# Patient Record
Sex: MALE | Race: WHITE | Employment: OTHER | URBAN - METROPOLITAN AREA
[De-identification: names, ages, dates, MRNs, and addresses within clinical notes are randomized per-mention and may not be internally consistent; named-entity substitution may affect disease eponyms.]

---

## 2021-07-12 DIAGNOSIS — A69.20 LYME DISEASE: ICD-10-CM

## 2021-07-15 LAB
LYME AB IGM BY WB:: POSITIVE
LYME, EIA: 1.79 LIV (ref 0–1.2)

## 2022-12-13 NOTE — PROGRESS NOTES
[facility] PRE-ADMISSION TESTING INSTRUCTIONS    The Preadmission Testing patient is instructed accordingly using the following criteria (check applicable):    ARRIVAL INSTRUCTIONS:  [x] Parking the day of Surgery is located in the Main Entrance lot. Upon entering the door, make an immediate right to the surgery reception desk    [x] Bring photo ID and insurance card    [] Bring in a copy of Living will or Durable Power of  papers. [x] Please be sure to arrange for responsible adult to provide transportation to and from the hospital    [x] Please arrange for responsible adult to be with you for the 24 hour period post procedure due to having anesthesia    [x] If you awake am of surgery not feeling well or have temperature >100 please call [phone]    GENERAL INSTRUCTIONS:    [x] Nothing by mouth after midnight, including gum, candy, mints or water    [x] You may brush your teeth, but do not swallow any water    [x] Take medications as instructed with 1-2 oz of water    [] Stop herbal supplements and vitamins 5 days prior to procedure    [x] Follow preop dosing of blood thinners per physician instructions    [] Take 1/2 dose of evening insulin, but no insulin after midnight    [] No oral diabetic medications after midnight    [] If diabetic and have low blood sugar or feel symptomatic, take 1-2oz apple juice only    [] Bring inhalers day of surgery    [] Bring C-PAP/ Bi-Pap day of surgery    [] Bring urine specimen day of surgery    [x] Shower or bath with soap, lather and rinse well, AM of Surgery, no lotion, powders or creams to surgical site    [] Follow bowel prep as instructed per surgeon    [x] No tobacco products within 24 hours of surgery     [x] No alcohol or illegal drug use within 24 hours of surgery.     [x] Jewelry, body piercing's, eyeglasses, contact lenses and dentures are not permitted into surgery (bring cases)      [x] Please do not wear any nail polish, make up or hair products on the day of surgery    [x] You can expect a call the business day prior to procedure to notify you if your arrival time changes    [x] If you receive a survey after surgery we would greatly appreciate your comments    [] Parent/guardian of a minor must accompany their child and remain on the premises  the entire time they are under our care     [] Pediatric patients may bring favorite toy, blanket or comfort item with them    [] A caregiver or family member must remain with the patient during their stay if they are mentally handicapped, have dementia, disoriented or unable to use a call light or would be a safety concern if left unattended    [x] Please notify surgeon if you develop any illness between now and time of surgery (cold, cough, sore throat, fever, nausea, vomiting) or any signs of infections  including skin, wounds, and dental.    [x]  The Outpatient Pharmacy is available to fill your prescription here on your day of surgery, ask your preop nurse for details    [] Other instructions    EDUCATIONAL MATERIALS PROVIDED:    [] PAT Preoperative Education Packet/Booklet     [] Medication List    [] Transfusion bracelet applied with instructions    [] Shower with soap, lather and rinse well, and use CHG wipes provided the evening before surgery as instructed    [] Incentive spirometer with instructions

## 2022-12-14 ENCOUNTER — ANESTHESIA (OUTPATIENT)
Dept: OPERATING ROOM | Age: 72
End: 2022-12-14
Payer: MEDICARE

## 2022-12-14 ENCOUNTER — ANESTHESIA EVENT (OUTPATIENT)
Dept: OPERATING ROOM | Age: 72
End: 2022-12-14
Payer: MEDICARE

## 2022-12-14 ENCOUNTER — HOSPITAL ENCOUNTER (OUTPATIENT)
Age: 72
Setting detail: OUTPATIENT SURGERY
Discharge: HOME | End: 2022-12-14
Attending: ORTHOPAEDIC SURGERY | Admitting: ORTHOPAEDIC SURGERY
Payer: MEDICARE

## 2022-12-14 ENCOUNTER — APPOINTMENT (OUTPATIENT)
Dept: GENERAL RADIOLOGY | Age: 72
End: 2022-12-14
Attending: ORTHOPAEDIC SURGERY
Payer: MEDICARE

## 2022-12-14 VITALS
BODY MASS INDEX: 27.28 KG/M2 | HEART RATE: 58 BPM | SYSTOLIC BLOOD PRESSURE: 144 MMHG | RESPIRATION RATE: 17 BRPM | OXYGEN SATURATION: 97 % | HEIGHT: 68 IN | DIASTOLIC BLOOD PRESSURE: 68 MMHG | WEIGHT: 180 LBS | TEMPERATURE: 96.8 F

## 2022-12-14 DIAGNOSIS — G89.18 POST-OP PAIN: Primary | ICD-10-CM

## 2022-12-14 DIAGNOSIS — S61.210A LACERATION OF RIGHT INDEX FINGER WITHOUT FOREIGN BODY WITHOUT DAMAGE TO NAIL, INITIAL ENCOUNTER: ICD-10-CM

## 2022-12-14 PROCEDURE — 87077 CULTURE AEROBIC IDENTIFY: CPT

## 2022-12-14 PROCEDURE — 3700000000 HC ANESTHESIA ATTENDED CARE: Performed by: ORTHOPAEDIC SURGERY

## 2022-12-14 PROCEDURE — 6360000002 HC RX W HCPCS: Performed by: NURSE ANESTHETIST, CERTIFIED REGISTERED

## 2022-12-14 PROCEDURE — 87186 SC STD MICRODIL/AGAR DIL: CPT

## 2022-12-14 PROCEDURE — 87116 MYCOBACTERIA CULTURE: CPT

## 2022-12-14 PROCEDURE — 87205 SMEAR GRAM STAIN: CPT

## 2022-12-14 PROCEDURE — 11012 DEB SKIN BONE AT FX SITE: CPT | Performed by: ORTHOPAEDIC SURGERY

## 2022-12-14 PROCEDURE — 11044 DBRDMT BONE 1ST 20 SQ CM/<: CPT | Performed by: ORTHOPAEDIC SURGERY

## 2022-12-14 PROCEDURE — 11760 REPAIR OF NAIL BED: CPT | Performed by: ORTHOPAEDIC SURGERY

## 2022-12-14 PROCEDURE — 3209999900 FLUORO FOR SURGICAL PROCEDURES

## 2022-12-14 PROCEDURE — 87206 SMEAR FLUORESCENT/ACID STAI: CPT

## 2022-12-14 PROCEDURE — 2580000003 HC RX 258: Performed by: NURSE ANESTHETIST, CERTIFIED REGISTERED

## 2022-12-14 PROCEDURE — 6360000002 HC RX W HCPCS: Performed by: PHYSICIAN ASSISTANT

## 2022-12-14 PROCEDURE — 87102 FUNGUS ISOLATION CULTURE: CPT

## 2022-12-14 PROCEDURE — 7100000011 HC PHASE II RECOVERY - ADDTL 15 MIN: Performed by: ORTHOPAEDIC SURGERY

## 2022-12-14 PROCEDURE — 87176 TISSUE HOMOGENIZATION CULTR: CPT

## 2022-12-14 PROCEDURE — 26756 PIN FINGER FRACTURE EACH: CPT | Performed by: ORTHOPAEDIC SURGERY

## 2022-12-14 PROCEDURE — 2709999900 HC NON-CHARGEABLE SUPPLY: Performed by: ORTHOPAEDIC SURGERY

## 2022-12-14 PROCEDURE — 3600000012 HC SURGERY LEVEL 2 ADDTL 15MIN: Performed by: ORTHOPAEDIC SURGERY

## 2022-12-14 PROCEDURE — 3700000001 HC ADD 15 MINUTES (ANESTHESIA): Performed by: ORTHOPAEDIC SURGERY

## 2022-12-14 PROCEDURE — 87070 CULTURE OTHR SPECIMN AEROBIC: CPT

## 2022-12-14 PROCEDURE — 87075 CULTR BACTERIA EXCEPT BLOOD: CPT

## 2022-12-14 PROCEDURE — 2500000003 HC RX 250 WO HCPCS: Performed by: ORTHOPAEDIC SURGERY

## 2022-12-14 PROCEDURE — 7100000010 HC PHASE II RECOVERY - FIRST 15 MIN: Performed by: ORTHOPAEDIC SURGERY

## 2022-12-14 PROCEDURE — 2580000003 HC RX 258: Performed by: PHYSICIAN ASSISTANT

## 2022-12-14 PROCEDURE — 3600000002 HC SURGERY LEVEL 2 BASE: Performed by: ORTHOPAEDIC SURGERY

## 2022-12-14 PROCEDURE — 87106 FUNGI IDENTIFICATION YEAST: CPT

## 2022-12-14 DEVICE — K WIRE FIX L6IN DIA0.045IN 1600645] MICROAIRE SURGICAL INSTRUMENTS INC]: Type: IMPLANTABLE DEVICE | Site: HAND | Status: FUNCTIONAL

## 2022-12-14 RX ORDER — PROPOFOL 10 MG/ML
INJECTION, EMULSION INTRAVENOUS CONTINUOUS PRN
Status: DISCONTINUED | OUTPATIENT
Start: 2022-12-14 | End: 2022-12-14 | Stop reason: SDUPTHER

## 2022-12-14 RX ORDER — SODIUM CHLORIDE 0.9 % (FLUSH) 0.9 %
5-40 SYRINGE (ML) INJECTION EVERY 12 HOURS SCHEDULED
Status: DISCONTINUED | OUTPATIENT
Start: 2022-12-14 | End: 2022-12-14 | Stop reason: HOSPADM

## 2022-12-14 RX ORDER — MEPERIDINE HYDROCHLORIDE 25 MG/ML
12.5 INJECTION INTRAMUSCULAR; INTRAVENOUS; SUBCUTANEOUS EVERY 10 MIN PRN
Status: DISCONTINUED | OUTPATIENT
Start: 2022-12-14 | End: 2022-12-14 | Stop reason: HOSPADM

## 2022-12-14 RX ORDER — FENTANYL CITRATE 50 UG/ML
25 INJECTION, SOLUTION INTRAMUSCULAR; INTRAVENOUS EVERY 5 MIN PRN
Status: DISCONTINUED | OUTPATIENT
Start: 2022-12-14 | End: 2022-12-14 | Stop reason: HOSPADM

## 2022-12-14 RX ORDER — CEPHALEXIN 500 MG/1
500 CAPSULE ORAL 3 TIMES DAILY
Qty: 21 CAPSULE | Refills: 0 | Status: SHIPPED | OUTPATIENT
Start: 2022-12-14 | End: 2022-12-21

## 2022-12-14 RX ORDER — SODIUM CHLORIDE 0.9 % (FLUSH) 0.9 %
5-40 SYRINGE (ML) INJECTION PRN
Status: DISCONTINUED | OUTPATIENT
Start: 2022-12-14 | End: 2022-12-14 | Stop reason: HOSPADM

## 2022-12-14 RX ORDER — SODIUM CHLORIDE 9 MG/ML
INJECTION, SOLUTION INTRAVENOUS CONTINUOUS PRN
Status: DISCONTINUED | OUTPATIENT
Start: 2022-12-14 | End: 2022-12-14 | Stop reason: SDUPTHER

## 2022-12-14 RX ORDER — SODIUM CHLORIDE 9 MG/ML
INJECTION, SOLUTION INTRAVENOUS CONTINUOUS
Status: DISCONTINUED | OUTPATIENT
Start: 2022-12-14 | End: 2022-12-14 | Stop reason: HOSPADM

## 2022-12-14 RX ORDER — PROCHLORPERAZINE EDISYLATE 5 MG/ML
5 INJECTION INTRAMUSCULAR; INTRAVENOUS
Status: DISCONTINUED | OUTPATIENT
Start: 2022-12-14 | End: 2022-12-14 | Stop reason: HOSPADM

## 2022-12-14 RX ORDER — FENTANYL CITRATE 50 UG/ML
INJECTION, SOLUTION INTRAMUSCULAR; INTRAVENOUS PRN
Status: DISCONTINUED | OUTPATIENT
Start: 2022-12-14 | End: 2022-12-14 | Stop reason: SDUPTHER

## 2022-12-14 RX ORDER — SODIUM CHLORIDE 9 MG/ML
25 INJECTION, SOLUTION INTRAVENOUS PRN
Status: DISCONTINUED | OUTPATIENT
Start: 2022-12-14 | End: 2022-12-14 | Stop reason: HOSPADM

## 2022-12-14 RX ORDER — OXYCODONE HYDROCHLORIDE AND ACETAMINOPHEN 5; 325 MG/1; MG/1
1 TABLET ORAL EVERY 6 HOURS PRN
Qty: 20 TABLET | Refills: 0 | Status: SHIPPED | OUTPATIENT
Start: 2022-12-14 | End: 2022-12-19

## 2022-12-14 RX ORDER — DIPHENHYDRAMINE HYDROCHLORIDE 50 MG/ML
12.5 INJECTION INTRAMUSCULAR; INTRAVENOUS
Status: DISCONTINUED | OUTPATIENT
Start: 2022-12-14 | End: 2022-12-14 | Stop reason: HOSPADM

## 2022-12-14 RX ORDER — SODIUM CHLORIDE 9 MG/ML
INJECTION, SOLUTION INTRAVENOUS PRN
Status: DISCONTINUED | OUTPATIENT
Start: 2022-12-14 | End: 2022-12-14 | Stop reason: HOSPADM

## 2022-12-14 RX ORDER — LIDOCAINE HYDROCHLORIDE 10 MG/ML
INJECTION, SOLUTION EPIDURAL; INFILTRATION; INTRACAUDAL; PERINEURAL PRN
Status: DISCONTINUED | OUTPATIENT
Start: 2022-12-14 | End: 2022-12-14 | Stop reason: ALTCHOICE

## 2022-12-14 RX ADMIN — SODIUM CHLORIDE: 9 INJECTION, SOLUTION INTRAVENOUS at 10:18

## 2022-12-14 RX ADMIN — FENTANYL CITRATE 100 MCG: 50 INJECTION, SOLUTION INTRAMUSCULAR; INTRAVENOUS at 10:23

## 2022-12-14 RX ADMIN — WATER 2000 MG: 1 INJECTION INTRAMUSCULAR; INTRAVENOUS; SUBCUTANEOUS at 10:18

## 2022-12-14 RX ADMIN — PROPOFOL 100 MCG/KG/MIN: 10 INJECTION, EMULSION INTRAVENOUS at 10:23

## 2022-12-14 ASSESSMENT — LIFESTYLE VARIABLES: SMOKING_STATUS: 0

## 2022-12-14 ASSESSMENT — PAIN SCALES - GENERAL: PAINLEVEL_OUTOF10: 0

## 2022-12-14 NOTE — H&P
Department of Orthopedic Surgery  History and Physical        CHIEF COMPLAINT: Right index and middle finger lacerations     HISTORY OF PRESENT ILLNESS:                 The patient is a RHD 71 y.o. male who presents with right middle and index finger lacerations. Patient reports yesterday he was making a bed using a table saw when he sustained a laceration to his right index and middle finger. He went to [place] emergency department where he was evaluated. He was found to have open fractures to his right index and middle fingers. Sutures placed. He did have IV antibiotics in the emergency department. He has not had time to fill his oral antibiotics yet. He is retired. Past Medical History:    Past Medical History             Diagnosis Date    CAD (coronary artery disease)      Hyperlipidemia      Hypertension      Hypothyroid           Past Surgical History:    Past Surgical History             Procedure Laterality Date    CORONARY ANGIOPLASTY WITH STENT PLACEMENT   11/23/2015         Current Medications:   Current Hospital Medications   No current facility-administered medications for this visit. Allergies:  Patient has no known allergies. Social History:   TOBACCO:   reports that he has quit smoking. His smoking use included cigarettes. He has never used smokeless tobacco.  ETOH:   reports current alcohol use. DRUGS:   reports no history of drug use. ACTIVITIES OF DAILY LIVING:    OCCUPATION:    Family History:   Family History   No family history on file.         REVIEW OF SYSTEMS:  CONSTITUTIONAL:  negative  EYES:  negative  HEENT:  negative  RESPIRATORY:  negative  CARDIOVASCULAR:  HTN, Hyperlipidemia  GASTROINTESTINAL:  negative  GENITOURINARY:  negative  INTEGUMENT/BREAST:  negative  HEMATOLOGIC/LYMPHATIC:  negative  ALLERGIC/IMMUNOLOGIC:  negative  ENDOCRINE:  negative  MUSCULOSKELETAL:  right index and middle finger  NEUROLOGICAL:  negative  BEHAVIOR/PSYCH:  negative     PHYSICAL EXAM: VITALS:  Ht 5' 8\" (1.727 m)   Wt 180 lb (81.6 kg)   BMI 27.37 kg/m²   CONSTITUTIONAL:  awake, alert, cooperative, no apparent distress, and appears stated age  EYES:  Lids and lashes normal, pupils equal, round and reactive to light, extra ocular muscles intact, sclera clear, conjunctiva normal  ENT:  Normocephalic, without obvious abnormality, atraumatic, sinuses nontender on palpation, external ears without lesions, oral pharynx with moist mucus membranes, tonsils without erythema or exudates, gums normal and good dentition. NECK:  Supple, symmetrical, trachea midline, no adenopathy, thyroid symmetric, not enlarged and no tenderness, skin normal  LUNGS:  CTA  CARDIOVASCULAR:  2+ radial pulses, extremities warm and well perfused  ABDOMEN:  obese, NTTP  CHEST:  Atraumatic   GENITAL/URINARY:  deferred  NEUROLOGIC:  Awake, alert, oriented to name, place and time. Cranial nerves II-XII are grossly intact. Motor is 5 out of 5 bilaterally. Sensory is intact.  gait is normal.  MUSCULOSKELETAL:     Right upper extremity: index finger with laceration over the dorsal radial aspect over the DIP joint. Sutures in place. Mild duskiness to the flap. Bloody drainage present. No erythema or signs of infection. + FDP and FDS function to the index finger with radial and ulnar digital n intact to light touch. Middle finger with laceration over his DIP joint dorsally extending volarly. Deformity with extensor lag to the DIP joint. No erythema or signs of infection. + bloody drainage. FDP and FDS intact today. Radial and ulnar digital n intact to light touch proximally to the laceration. Over the pulp of the middle finger there is no sensation distal to his middle finger ulnar digital nerve. Brisk capillary refill. Otherwise NVI.          DATA:    CBC: No results found for: WBC, RBC, HGB, HCT, MCV, MCH, MCHC, RDW, PLT, MPV  PT/INR:  No results found for: PROTIME, INR     Radiology Review:  Xray: x-rays of the right hand were obtained today in the office and reviewed with the patient. 3 views: AP/lateral/oblique : demonstrate DIP joint arthritis to the right index and middle finger. Index finger with middle phalanx nondisplaced fracture. Middle finger with distal phalanx displaced fracture and intra-articular fracture of the middle phalanx. Impression: Right index and middle finger middle phalanx fracture, right middle finger distal phalanx fracture     IMPRESSION:  Open, right index finger middle phalanx fracture with laceration  Open, right middle finger distal phalanx and middle phalanx fracture with laceration  HTN  Hyperlipidemia     PLAN:  Discussed findings with the patient at today's visit. Discussed conservative and surgical management with the patient. Patient opted to proceed with surgical management. Recommended right index and middle finger excisional debridement and fracture fixation. Patient to start his oral antibiotics. Discussed his underlying arthritis and stiffness postoperatively. Until surgery the patient was provided a removable splint and is to start daily dressing changes. Patient would like to proceed. All questions answered. I explained the risks, benefits, alternatives and complications of surgery with the patient including but not limited to the risks of death, possible damage to nerves, vessels, or tendons, possible infection, possible nonunion, possible malunion, possible hardware failure, possible need for hardware removal, stiffness, as well as the possible need further surgery and unanticipated complications. The patient voiced understanding and all questions were answered. The patient elected to proceed with surgical intervention. I have seen and evaluated the patient and agree with the above assessment and plan on today's visit.  I have performed the key components of the history and physical examination with significant findings of tablesaw injury right index and middle fingers with associated fractures and soft tissue injuries. 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 Discussed complexity of the injury and both surgical and nonsurgical treatment options. Patient wishes to pursue surgical invention to minimize the risk of infection and long-term sequelae. Discussed complexity of the injury and possible need for secondary procedures. . I concur with the findings and plan as documented. History and Physical Update     Patient was seen and examined. Patient's history and physical was reviewed with the patient. There has been no significant interval changes.       Electronically signed by Lew Cook DO on 12/14/2022 at 8:31 AM

## 2022-12-14 NOTE — DISCHARGE INSTRUCTIONS
DISCHARGE INSTRUCTIONS TO HOME FOLLOWING SURGERY    ACTIVITY INSTRUCTIONS:    Elevate extremity to help reduce swelling. Use Purple Pillow for elevation of hand/arm   Use sling as needed. No heavy lifting, pushing, pulling or strenuous activity    WOUND/DRESSING INSTRUCTIONS:  Always ensure you and your care giver clean hands before and after caring for the wound. Keep the dressing, cast, or splint clean and dry until seen in office. Do not remove dressing   OK to shower- Keep your dressing dry. Can ice surgical region to help reduce swelling, Do not apply ice to fingers or toes       MEDICATION INSTRUCTIONS:  Take pain medicine as directed. Take antibiotics as prescribed: Keflex 500 mg, 3x/day for 7 days  When taking pain medications, you may experience dizziness or drowsiness. Do not drink alcohol or drive when taking these medications. Do not take any other medication containing acetaminophen (Tylenol) while taking the prescribed pain medication. Ibuprofen, Aleve, Motrin, or Naproxen are okay but should not be taken on an empty stomach. *Watch for these significant complications:  Call physician if these or any other problems occur:  Fever over 101°, redness, swelling or warmth at the operative site  Unrelieved nausea    Foul smelling or cloudy drainage at the operative site   Unrelieved pain  Breathing issues such as shortness of breath or difficulty breathing    Blood soaked dressing. (Some oozing may be normal)     Numb, pale, blue, cold or tingling extremity       Make an appointment to be seen 8-10 days after surgery  FOLLOW-UP CARE:    Dr. Barbara Calderon.  [address]  [facility]  [phone]

## 2022-12-14 NOTE — ANESTHESIA POSTPROCEDURE EVALUATION
Department of Anesthesiology  Postprocedure Note    Patient: Vi Mckeon  MRN: [id number]  YOB: 1950  Date of evaluation: 12/14/2022      Procedure Summary     Date: 12/14/22 Room / Location: [facility] OR 03 / [facility]    Anesthesia Start: 1018 Anesthesia Stop: 1394    Procedure: RIGHT INDEX AND MIDDLE FINGER INCISION AND DRAINAGE RIGHT MIDDLE FINGER FRACTURE FIXATION WITH PINNING (Right: Hand) Diagnosis:       Laceration of right index finger without foreign body without damage to nail, initial encounter      (Laceration of right index finger without foreign body without damage to nail, initial encounter [S61.210A])    Surgeons: Cristiana Maldonado MD Responsible Provider: Merle Loyola MD    Anesthesia Type: MAC ASA Status: 3          Anesthesia Type: No value filed.     Aleena Phase I: Aleena Score: 10    Aleena Phase II:        Anesthesia Post Evaluation    Patient location during evaluation: PACU  Patient participation: complete - patient participated  Level of consciousness: awake and alert  Pain score: 1  Airway patency: patent  Nausea & Vomiting: no nausea and no vomiting  Complications: no  Cardiovascular status: hemodynamically stable  Respiratory status: acceptable

## 2022-12-14 NOTE — BRIEF OP NOTE
Brief Postoperative Note      Patient: Chance Falcon  YOB: 1950  MRN: [id number]    Date of Procedure: 12/14/2022    Pre-Op Diagnosis: Laceration of right index finger without foreign body without damage to nail, initial encounter [P60.507E]    Post-Op Diagnosis: Same       Procedure(s):  RIGHT INDEX AND MIDDLE FINGER INCISION AND DRAINAGE RIGHT MIDDLE FINGER FRACTURE FIXATION WITH PINNING    Surgeon(s):  Shirin Hoffman MD    Assistant:  Resident: Nadine Kelly DO    Anesthesia: Monitor Anesthesia Care    Estimated Blood Loss (mL): Minimal    Complications: None    Specimens:   ID Type Source Tests Collected by Time Destination   1 : excisional debridement of right hand Tissue Tissue CULTURE, ANAEROBIC, CULTURE, FUNGUS, GRAM STAIN, CULTURE, SURGICAL, CULTURE WITH SMEAR, ACID FAST [address] [place] [address] Benton Barrett MD 12/14/2022 1043        Implants:  Implant Name Type Inv.  Item Serial No.  Lot No. LRB No. Used Action   K WIRE FIX L6IN DIA0.045IN [id number]] [facility] [employer]] - [id number]  K WIRE FIX L6IN DIA0.045IN [id number]] [employer]]  [facility] [employer]-WD [id number] Right 2 Implanted         Drains: * No LDAs found *    Findings: see op note    Electronically signed by Nadine Kelly DO on 12/14/2022 at 11:22 AM

## 2022-12-14 NOTE — ANESTHESIA PRE PROCEDURE
Department of Anesthesiology  Preprocedure Note       Name:  Zuly Vargas   Age:  71 y.o.  :  1950                                          MRN:  [id number]         Date:  2022      Surgeon: Kj Devine):  Laron Merlin, MD    Procedure: Procedure(s):  RIGHT INDEX AND MIDDLE FINGER INCISION AND DRAINAGE RIGHT MIDDLE FINGER FRACTURE FIXATION WITH PINNING    Medications prior to admission:   Prior to Admission medications    Medication Sig Start Date End Date Taking? Authorizing Provider   oxyCODONE-acetaminophen (PERCOCET) 5-325 MG per tablet Take 1 tablet by mouth every 6 hours as needed for Pain for up to 5 days. Intended supply: 5 days. Take lowest dose possible to manage pain 22 Yes Elton Miller DO   cephALEXin [facility]) 500 MG capsule Take by mouth 22   Historical Provider, MD   sodium chloride 0.9 % irrigation Soak affected area with half  NS and half hydrogen peroxide.  22   Laron Merlin, MD   bacitracin-polymyxin b (POLYSPORIN) 500-53232 UNIT/GM ointment APPLY TO AFFECTED AREA BID 22   Laron Merlin, MD   atorvastatin (LIPITOR) 40 MG tablet  21   Historical Provider, MD   SYNTHROID 125 MCG tablet  21   Historical Provider, MD   lisinopril (PRINIVIL;ZESTRIL) 2.5 MG tablet  21   Historical Provider, MD   metoprolol succinate (TOPROL XL) 25 MG extended release tablet 50 mg 21   Historical Provider, MD   aspirin 81 MG chewable tablet Take 81 mg by mouth daily    Historical Provider, MD       Current medications:    Current Facility-Administered Medications   Medication Dose Route Frequency Provider Last Rate Last Admin    0.9 % sodium chloride infusion   IntraVENous Continuous NIR Byrnes        sodium chloride flush 0.9 % injection 5-40 mL  5-40 mL IntraVENous 2 times per day NIR Byrnes        sodium chloride flush 0.9 % injection 5-40 mL  5-40 mL IntraVENous PRN NIR Byrnes        0.9 % sodium chloride infusion IntraVENous PRN NIR Tejada        ceFAZolin (ANCEF) 2,000 mg in sterile water 20 mL IV syringe  2,000 mg IntraVENous On Call to [address]           Allergies:  No Known Allergies    Problem List:  There is no problem list on file for this patient. Past Medical History:        Diagnosis Date    CAD (coronary artery disease)     Hyperlipidemia     Hypertension     Hypothyroid        Past Surgical History:        Procedure Laterality Date    CORONARY ANGIOPLASTY WITH STENT PLACEMENT  11/23/2015    LEG DEBRIDEMENT         Social History:    Social History     Tobacco Use    Smoking status: Former     Types: Cigarettes    Smokeless tobacco: Never   Substance Use Topics    Alcohol use: Yes     Comment: socially                                Counseling given: Not Answered      Vital Signs (Current):   Vitals:    12/13/22 0948 12/14/22 0845   BP:  (!) 163/79   Pulse:  52   Resp:  16   Temp:  98.2 °F (36.8 °C)   TempSrc:  Tympanic   SpO2:  97%   Weight: 180 lb (81.6 kg)    Height: 5' 8\" (1.727 m)                                               BP Readings from Last 3 Encounters:   12/14/22 (!) 163/79   07/12/21 134/80       NPO Status: Time of last liquid consumption: 0000                        Time of last solid consumption: 0000                        Date of last liquid consumption: 12/13/22                        Date of last solid food consumption: 12/13/22    BMI:   Wt Readings from Last 3 Encounters:   12/13/22 180 lb (81.6 kg)   12/08/22 180 lb (81.6 kg)   07/12/21 174 lb (78.9 kg)     Body mass index is 27.37 kg/m². CBC: No results found for: WBC, RBC, HGB, HCT, MCV, RDW, PLT    CMP: No results found for: NA, K, CL, CO2, BUN, CREATININE, GFRAA, AGRATIO, LABGLOM, GLUCOSE, GLU, PROT, CALCIUM, BILITOT, ALKPHOS, AST, ALT    POC Tests: No results for input(s): POCGLU, POCNA, POCK, POCCL, POCBUN, POCHEMO, POCHCT in the last 72 hours.     Coags: No results found for: PROTIME, INR, APTT    HCG (If Applicable): No results found for: PREGTESTUR, PREGSERUM, HCG, HCGQUANT     ABGs: No results found for: PHART, PO2ART, AOZ2QOC, UDA7AAL, BEART, G7BDAWPF     Type & Screen (If Applicable):  No results found for: LABABO, LABRH    Drug/Infectious Status (If Applicable):  No results found for: HIV, HEPCAB    COVID-19 Screening (If Applicable): No results found for: COVID19        Anesthesia Evaluation  Patient summary reviewed and Nursing notes reviewed no history of anesthetic complications:   Airway: Mallampati: II  TM distance: >3 FB   Neck ROM: full  Mouth opening: > = 3 FB   Dental:    (+) caps      Pulmonary: breath sounds clear to auscultation      (-) not a current smoker          Patient did not smoke on day of surgery. Cardiovascular:  Exercise tolerance: good (>4 METS),   (+) hypertension:, CABG/stent:, hyperlipidemia    (-)  angina      Rhythm: regular  Rate: normal           Beta Blocker:  Dose within 24 Hrs         Neuro/Psych:   (+) neuromuscular disease:,             GI/Hepatic/Renal: Neg GI/Hepatic/Renal ROS            Endo/Other:    (+) hypothyroidism: arthritis:., malignancy/cancer. Abdominal:             Vascular: negative vascular ROS. Other Findings:           Anesthesia Plan      MAC     ASA 3       Induction: intravenous. Anesthetic plan and risks discussed with patient. Derrick Vang MD   12/14/2022        Patient seen and chart reviewed. No interval change in history or exam.   Anesthesia plan discussed, risk/benefits addressed. Patient's concerns and questions answered.      Lizz Kim, APRN - CRNA  December 14, 2022  9:55 AM

## 2022-12-15 LAB — GRAM STAIN ORDERABLE: NORMAL

## 2022-12-15 NOTE — PROGRESS NOTES
CLINICAL PHARMACY NOTE: MEDS TO BEDS    Total # of Prescriptions Filled: 2   The following medications were delivered to the patient:  Percocet 5/325  Keflex 500    Additional Documentation:

## 2022-12-15 NOTE — OP NOTE
[id number] [address]                                OPERATIVE REPORT    PATIENT NAME: Fransisca Merrill                    :        1950  MED REC NO:   [id number]                            ROOM:  ACCOUNT NO:   [de-identified]                           ADMIT DATE: 2022  PROVIDER:     Juvenal Ballard MD    DATE OF PROCEDURE:  2022    PREOPERATIVE DIAGNOSIS:  Right index and middle finger open fractures. POSTOPERATIVE DIAGNOSES:  1. Right index finger complex laceration with open distal  interphalangeal joint and approximately 30% injury to the tendon. 2.  Right middle finger comminuted distal phalanx fracture with  dislocation of the distal interphalangeal joint and disruption of the  profundus tendon involving approximately 10% to 15%. 3.  Nail bed laceration, right middle finger. SURGERY PERFORMED:  1. Right index and middle finger excisional debridement of open  fractures including skin, subcutaneous tissues, deep fascia, and bone. 2.  Right index finger terminal extensor tendon partial laceration with  joint debridement and closure. 3.  Right middle finger open reduction and K-wire fixation of fracture  dislocation involving the distal phalanx and distal interphalangeal  joint. 4.  Right middle finger nail bed repair with 5-0 Monocryl suture. 5.  Complex wound closure, right middle finger, approximately 6 cm. ANESTHESIA:  1. Monitored anesthesia care. 2.  Local anesthetic by surgeon consisting of approximately 10 mL of 1%  lidocaine plain. ASSISTANT:  Larry Clark DO, Orthopedic Surgery Resident. SPECIMENS:  Excised tissue was sent for culture. BLOOD LOSS:  Minimal.    TOURNIQUET TIME:  Approximately 30 minutes at 250 mmHg of brachial  tourniquet. COMPLICATIONS:  None.     DISPOSITION:  The patient remained stable throughout the procedure and  was taken to the recovery portion of the terminal  tendon and joint capsule over the dorsal radial aspect. This was sent  for culture. This was then followed by thorough and copious irrigation  of the wound. The middle finger was addressed in the similar fashion removing the  previously placed sutures. There was a significant soft tissue injury  extending down to the dorsal skin on the ulnar aspect of the digit into  the joint and exiting volarly involving nearly the entire ulnar aspect  of the digit and involved the profundus tendon palmarly injuring  approximately 10% to 15%. Devitalized tissues including skin,  subcutaneous tissues, deep fascia were excised with 15-blade scalpel  followed by thorough and copious irrigation of the wound. There was  near complete amputation to the level of the distal interphalangeal  joint; however, there was intact terminal tendon and flexor tendon and  radial soft tissues. A nail plate was removed, which revealed a  segmental injury involving distal phalanx. This fracture was also  debrided. Fluoroscopy was then brought in. The distal interphalangeal joint was  reduced as well as the distal phalanx fracture under direct  visualization, confirmed under fluoroscopy. Two 0.045 K-wires were  placed across the fracture of the distal phalanx and across the reduced  distal interphalangeal joint maintaining near anatomic osseous  alignment. With this achieved, attention was directed towards the nail  bed. Nail bed was then repaired after the fracture was reduced with 5-0  Monocryl suture. The nail plate was cleansed, debrided, and reinserted  and sewn to the place with nylon sutures. Complex wound closure was then undertaken. This was estimated at 6 to  10 cm above the middle and ring fingers with multiple 4-0 nylon sutures. The tourniquet was deflated. There was healthy brisk capillary refill  in the index and middle fingers. The dorsal flap of the index finger  appeared to be viable. Bacitracin ointment was then placed over the  wounds followed by nonadherent dressing and a splint. The patient  remained stable and was taken to the recovery room.         Marlen Black MD    D: 12/14/2022 18:08:51       T: 12/15/2022 4:59:48     [email or web address]  Job#: [id number]     Doc#: [id number]

## 2022-12-16 LAB — AFB SMEAR: NORMAL

## 2022-12-17 LAB
ANAEROBIC CULTURE: NORMAL
FUNGUS STAIN: NORMAL

## 2022-12-18 LAB
CULTURE SURGICAL: ABNORMAL
ORGANISM: ABNORMAL
ORGANISM: ABNORMAL

## 2022-12-21 ENCOUNTER — TELEPHONE (OUTPATIENT)
Dept: ORTHOPEDIC SURGERY | Age: 72
End: 2022-12-21

## 2022-12-21 DIAGNOSIS — S61.212A LACERATION OF RIGHT MIDDLE FINGER, FOREIGN BODY PRESENCE UNSPECIFIED, NAIL DAMAGE STATUS UNSPECIFIED, INITIAL ENCOUNTER: ICD-10-CM

## 2022-12-21 DIAGNOSIS — S61.210A LACERATION OF RIGHT INDEX FINGER, FOREIGN BODY PRESENCE UNSPECIFIED, NAIL DAMAGE STATUS UNSPECIFIED, INITIAL ENCOUNTER: Primary | ICD-10-CM

## 2022-12-21 RX ORDER — AMOXICILLIN AND CLAVULANATE POTASSIUM 875; 125 MG/1; MG/1
1 TABLET, FILM COATED ORAL 2 TIMES DAILY
Qty: 14 TABLET | Refills: 0 | Status: SHIPPED | OUTPATIENT
Start: 2022-12-21 | End: 2022-12-28

## 2022-12-22 ENCOUNTER — OFFICE VISIT (OUTPATIENT)
Dept: ORTHOPEDIC SURGERY | Age: 72
End: 2022-12-22

## 2022-12-22 VITALS — BODY MASS INDEX: 27.28 KG/M2 | WEIGHT: 180 LBS | HEIGHT: 68 IN

## 2022-12-22 DIAGNOSIS — S61.212A LACERATION OF RIGHT MIDDLE FINGER, FOREIGN BODY PRESENCE UNSPECIFIED, NAIL DAMAGE STATUS UNSPECIFIED, INITIAL ENCOUNTER: Primary | ICD-10-CM

## 2022-12-22 PROCEDURE — 99024 POSTOP FOLLOW-UP VISIT: CPT | Performed by: ORTHOPAEDIC SURGERY

## 2022-12-22 RX ORDER — BACITRACIN ZINC AND POLYMYXIN B SULFATE 500; 1000 [USP'U]/G; [USP'U]/G
OINTMENT TOPICAL
Qty: 15 G | Refills: 1 | Status: SHIPPED | OUTPATIENT
Start: 2022-12-22 | End: 2022-12-29

## 2022-12-22 NOTE — PROGRESS NOTES
Postop right index and middle finger debridement pinning and repairs. Overall he is doing well. He just started taking his Augmentin for positive intraoperative cultures. Physical exam: Incisions are healing nicely. No active signs of infection. Brisk cap refill index and middle fingers. Pin sites are clean and dry. X-rays the office today: AP lateral obliques of the right hand were obtained demonstrating stable fixation of the middle finger distal phalanx and reducing the previously dislocated distal inner phalangeal joint. Impression office x-rays: Stable fixation right middle finger    Assessment postop right index and middle finger debridement and pinning and repairs    Plan. Today's findings were explained. Local wound care was explained. He will follow-up next week for nurse visit for suture removal.  He will then follow-up in 3 to 4 weeks with new x-rays for possible pin removal at that time. In addition we did explain to him local wound care and use of Polysporin. Finish off his Augmentin.

## 2022-12-23 LAB
FUNGUS (MYCOLOGY) CULTURE: ABNORMAL
FUNGUS STAIN: ABNORMAL
ORGANISM: ABNORMAL
ORGANISM: ABNORMAL

## 2022-12-27 ENCOUNTER — OFFICE VISIT (OUTPATIENT)
Dept: ORTHOPEDIC SURGERY | Age: 72
End: 2022-12-27

## 2022-12-27 VITALS — HEIGHT: 68 IN | BODY MASS INDEX: 27.28 KG/M2 | WEIGHT: 180 LBS

## 2022-12-27 DIAGNOSIS — S61.210A LACERATION OF RIGHT INDEX FINGER, FOREIGN BODY PRESENCE UNSPECIFIED, NAIL DAMAGE STATUS UNSPECIFIED, INITIAL ENCOUNTER: Primary | ICD-10-CM

## 2022-12-27 DIAGNOSIS — S61.212A LACERATION OF RIGHT MIDDLE FINGER, FOREIGN BODY PRESENCE UNSPECIFIED, NAIL DAMAGE STATUS UNSPECIFIED, INITIAL ENCOUNTER: ICD-10-CM

## 2022-12-27 PROCEDURE — 99024 POSTOP FOLLOW-UP VISIT: CPT | Performed by: ORTHOPAEDIC SURGERY

## 2022-12-27 NOTE — PROGRESS NOTES
12 days postop right index and middle finger debridement pinning and repairs. Overall he is doing well. He finishes his antibiotics tomorrow. Physical exam: Incisions are healing nicely. No active signs of infection. Areas of secondary healing present. No active drainage. Stiffness present with flexion of the index and middle finger at the level of the MCP and DIP joint. Brisk cap refill index and middle fingers. Pin sites are clean and dry. X-rays the office today: AP lateral obliques of the right hand were obtained demonstrating stable fixation of the middle finger distal phalanx and reducing the previously dislocated distal inner phalangeal joint. Impression office x-rays: Stable fixation right middle finger    Assessment postop right index and middle finger debridement and pinning and repairs    Plan. Continue local wound care. Sutures removed today. Continue splint. Splint adjusted today. Encouraged patient to remove for ROM exercises. This was demonstrated to the patient. Patient was very reluctant to try ROM exercises. Offered a referral to therapy. Patient declined. Patient to call the office with concerns of signs and symptoms of infection. Follow up in 3-4 weeks with repeat xrays and possible removal of pins at that time. I have seen and evaluated the patient and agree with the above assessment and plan on today's visit. I have performed the key components of the history and physical examination with significant findings of postop. I concur with the findings and plan as documented.     Abraham Rosario MD  12/27/2022

## 2023-01-20 ENCOUNTER — TELEPHONE (OUTPATIENT)
Dept: ORTHOPEDIC SURGERY | Age: 73
End: 2023-01-20

## 2023-01-20 DIAGNOSIS — S61.212A LACERATION OF RIGHT MIDDLE FINGER, FOREIGN BODY PRESENCE UNSPECIFIED, NAIL DAMAGE STATUS UNSPECIFIED, INITIAL ENCOUNTER: Primary | ICD-10-CM

## 2023-01-20 DIAGNOSIS — S61.210A LACERATION OF RIGHT INDEX FINGER, FOREIGN BODY PRESENCE UNSPECIFIED, NAIL DAMAGE STATUS UNSPECIFIED, INITIAL ENCOUNTER: ICD-10-CM

## 2023-01-23 ENCOUNTER — OFFICE VISIT (OUTPATIENT)
Dept: ORTHOPEDIC SURGERY | Age: 73
End: 2023-01-23

## 2023-01-23 VITALS — HEIGHT: 68 IN | WEIGHT: 180 LBS | BODY MASS INDEX: 27.28 KG/M2

## 2023-01-23 DIAGNOSIS — S61.212A LACERATION OF RIGHT MIDDLE FINGER, FOREIGN BODY PRESENCE UNSPECIFIED, NAIL DAMAGE STATUS UNSPECIFIED, INITIAL ENCOUNTER: Primary | ICD-10-CM

## 2023-01-23 PROCEDURE — 99024 POSTOP FOLLOW-UP VISIT: CPT | Performed by: ORTHOPAEDIC SURGERY

## 2023-01-23 NOTE — PROGRESS NOTES
6 weeks out right index and middle finger debridement and tenderness repairs of open fractures. Overall he is been somewhat noncompliant with home exercise program including MCP and PIP range of motion. He reports the middle finger is quite stiff. He is been trying to use the index finger. Physical exam: Pin sites clean and dry. Pins were removed under sterile condition without complication. There maintained osseous alignment. He has significant stiffness to good the middle finger at the MCP PIP and DIP joints with the finger held in full extension with limited active flexion and passive flexion of the digit. Index finger is doing somewhat better but also stiffness with limited active and passive range of motion. 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 No evidence of interval change in the alignment. December 27, 2022 films. Impression office x-rays: Stable pin fixation right index finger comminuted complex distal and middle phalanx fractures with underlying arthritis particularly of the index DIP joint and thumb basal joint    Assessment 6 weeks postop    Plan    Studies findings were explained. Pins were removed. Sterile bandage was applied. He was fitted with a stack splint which he may wean himself out of. I did recommend a course of therapy to improve index middle finger range of motion's. This to be coordinated closer to home for him. Follow-up in 4 to 6 weeks with new x-rays.

## 2023-02-21 ENCOUNTER — OFFICE VISIT (OUTPATIENT)
Dept: ORTHOPEDIC SURGERY | Age: 73
End: 2023-02-21

## 2023-02-21 VITALS — HEIGHT: 68 IN | BODY MASS INDEX: 27.28 KG/M2 | WEIGHT: 180 LBS

## 2023-02-21 DIAGNOSIS — S61.210A LACERATION OF RIGHT INDEX FINGER, FOREIGN BODY PRESENCE UNSPECIFIED, NAIL DAMAGE STATUS UNSPECIFIED, INITIAL ENCOUNTER: ICD-10-CM

## 2023-02-21 DIAGNOSIS — S61.212A LACERATION OF RIGHT MIDDLE FINGER, FOREIGN BODY PRESENCE UNSPECIFIED, NAIL DAMAGE STATUS UNSPECIFIED, INITIAL ENCOUNTER: Primary | ICD-10-CM

## 2023-02-21 PROCEDURE — 99024 POSTOP FOLLOW-UP VISIT: CPT | Performed by: ORTHOPAEDIC SURGERY

## 2023-02-21 NOTE — PROGRESS NOTES
9 weeks postop right index and middle finger joint debridement and ORIF. He has been noncompliant with his home exercise program.  However he does report he is using the hand. He has been using for chopping wood. He has minimal pain. His biggest complaint is stiffness particular around the PIP joint of the middle finger    Physical exam: Scars maturing nicely. He has full digital extension of the index and middle fingers. However he does have significant stiffness limiting composite flexion of the middle finger. Active equal to passive motion from 0 to about 50 degrees. He remains neurovascular intact. X-rays in office today: AP lateral obliques of his right hand demonstrate a highly comminuted fracture with early healing and joint arthritis present about the middle finger distal and phalangeal joint. Impression office x-rays: Right middle finger advanced degenerative changes distal interphalangeal joint with healing fracture    Assessment 9 weeks postop    Plan    Patient was offered but declined formal therapy. I again provided him a printout of home exercise program improving PIP joint flexion. These were also demonstrated and explained to him. He relates that he is probably not can do his exercises.   We will see him back as needed

## (undated) DEVICE — PADDING CAST W2INXL4YD COT LO LINTING WYTEX

## (undated) DEVICE — GLOVE SURG SZ 6 THK91MIL LTX FREE SYN POLYISOPRENE ANTI

## (undated) DEVICE — GLOVE ORANGE PI 8 1/2   MSG9085

## (undated) DEVICE — Device

## (undated) DEVICE — SPLINT ORTH W4XL15IN PLSTR OF PARIS LO EXOTHERM SMOOTH

## (undated) DEVICE — DRAPE MINI C ARM STRL

## (undated) DEVICE — SURGICAL PROCEDURE PACK HND

## (undated) DEVICE — BNDG,ELSTC,MATRIX,STRL,3"X5YD,LF,HOOK&LP: Brand: MEDLINE

## (undated) DEVICE — MUCUS TRAP, 10FR, DELEE, W/VA: Brand: MEDLINE INDUSTRIES, INC.

## (undated) DEVICE — SOLUTION IV IRRIG POUR BRL 0.9% SODIUM CHL 2F7124

## (undated) DEVICE — BNDG,ELSTC,MATRIX,STRL,2"X5YD,LF,HOOK&LP: Brand: MEDLINE

## (undated) DEVICE — DOUBLE BASIN SET: Brand: MEDLINE INDUSTRIES, INC.

## (undated) DEVICE — PADDING UNDERCAST W4INXL4YD COT FBR LO LINTING WYTEX

## (undated) DEVICE — GLOVE SURG SZ 65 L12IN FNGR THK79MIL GRN LTX FREE

## (undated) DEVICE — INTENDED FOR TISSUE SEPARATION, AND OTHER PROCEDURES THAT REQUIRE A SHARP SURGICAL BLADE TO PUNCTURE OR CUT.: Brand: BARD-PARKER ® STAINLESS STEEL BLADES

## (undated) DEVICE — ZIMMER® STERILE DISPOSABLE TOURNIQUET CUFF WITH PLC, DUAL PORT, SINGLE BLADDER, 18 IN. (46 CM)

## (undated) DEVICE — SOLUTION IV IRRIG WATER 1000ML POUR BRL 2F7114

## (undated) DEVICE — COVER HNDL LT DISP

## (undated) DEVICE — CRADLE ARM W8.75XH12.5XL16IN FOAM SUPP ELEVATION VENT

## (undated) DEVICE — PADDING,UNDERCAST,COTTON, 3X4YD STERILE: Brand: MEDLINE

## (undated) DEVICE — 4-PORT MANIFOLD: Brand: NEPTUNE 2

## (undated) DEVICE — CLOTH SURG PREP PREOPERATIVE CHLORHEXIDINE GLUC 2% READYPREP